# Patient Record
Sex: MALE
[De-identification: names, ages, dates, MRNs, and addresses within clinical notes are randomized per-mention and may not be internally consistent; named-entity substitution may affect disease eponyms.]

---

## 2020-01-27 ENCOUNTER — HOSPITAL ENCOUNTER (EMERGENCY)
Dept: HOSPITAL 92 - ERS | Age: 50
Discharge: HOME | End: 2020-01-27
Payer: COMMERCIAL

## 2020-01-27 DIAGNOSIS — E11.65: Primary | ICD-10-CM

## 2020-01-27 LAB
ALBUMIN SERPL BCG-MCNC: 4.9 G/DL (ref 3.5–5)
ALP SERPL-CCNC: 87 U/L (ref 40–110)
ALT SERPL W P-5'-P-CCNC: 23 U/L (ref 8–55)
ANION GAP SERPL CALC-SCNC: 14 MMOL/L (ref 10–20)
AST SERPL-CCNC: 17 U/L (ref 5–34)
BASOPHILS # BLD AUTO: 0 THOU/UL (ref 0–0.2)
BASOPHILS NFR BLD AUTO: 0.3 % (ref 0–1)
BILIRUB SERPL-MCNC: 0.5 MG/DL (ref 0.2–1.2)
BUN SERPL-MCNC: 11 MG/DL (ref 8.9–20.6)
CALCIUM SERPL-MCNC: 9.8 MG/DL (ref 7.8–10.44)
CHLORIDE SERPL-SCNC: 103 MMOL/L (ref 98–107)
CO2 SERPL-SCNC: 22 MMOL/L (ref 22–29)
CREAT CL PREDICTED SERPL C-G-VRATE: 0 ML/MIN (ref 70–130)
EOSINOPHIL # BLD AUTO: 0 THOU/UL (ref 0–0.7)
EOSINOPHIL NFR BLD AUTO: 0.5 % (ref 0–10)
GLOBULIN SER CALC-MCNC: 3.5 G/DL (ref 2.4–3.5)
GLUCOSE SERPL-MCNC: 281 MG/DL (ref 70–105)
HGB BLD-MCNC: 16.6 G/DL (ref 14–18)
LIPASE SERPL-CCNC: 133 U/L (ref 8–78)
LYMPHOCYTES # BLD: 2.1 THOU/UL (ref 1.2–3.4)
LYMPHOCYTES NFR BLD AUTO: 21.4 % (ref 21–51)
MCH RBC QN AUTO: 28.7 PG (ref 27–31)
MCV RBC AUTO: 86.1 FL (ref 78–98)
MONOCYTES # BLD AUTO: 0.3 THOU/UL (ref 0.11–0.59)
MONOCYTES NFR BLD AUTO: 2.8 % (ref 0–10)
NEUTROPHILS # BLD AUTO: 7.3 THOU/UL (ref 1.4–6.5)
NEUTROPHILS NFR BLD AUTO: 75.1 % (ref 42–75)
PLATELET # BLD AUTO: 311 THOU/UL (ref 130–400)
POTASSIUM SERPL-SCNC: 4.2 MMOL/L (ref 3.5–5.1)
RBC # BLD AUTO: 5.79 MILL/UL (ref 4.7–6.1)
SODIUM SERPL-SCNC: 135 MMOL/L (ref 136–145)
WBC # BLD AUTO: 9.7 THOU/UL (ref 4.8–10.8)

## 2020-01-27 PROCEDURE — 80053 COMPREHEN METABOLIC PANEL: CPT

## 2020-01-27 PROCEDURE — 93005 ELECTROCARDIOGRAM TRACING: CPT

## 2020-01-27 PROCEDURE — 36416 COLLJ CAPILLARY BLOOD SPEC: CPT

## 2020-01-27 PROCEDURE — 84484 ASSAY OF TROPONIN QUANT: CPT

## 2020-01-27 PROCEDURE — 82010 KETONE BODYS QUAN: CPT

## 2020-01-27 PROCEDURE — 96360 HYDRATION IV INFUSION INIT: CPT

## 2020-01-27 PROCEDURE — 85025 COMPLETE CBC W/AUTO DIFF WBC: CPT

## 2020-01-27 PROCEDURE — 83690 ASSAY OF LIPASE: CPT

## 2020-01-27 PROCEDURE — 71045 X-RAY EXAM CHEST 1 VIEW: CPT

## 2020-01-27 NOTE — RAD
EXAM: 

CHEST ONE VIEW



HISTORY:

Dizziness and generalized weakness.



COMPARISON:

8/15/2006.



FINDINGS:

The cardiac silhouette and pulmonary vasculature is within normal limits. The lungs are clear. The os
seous structures are intact. There has been no significant interval change from prior exam. 



IMPRESSION:

No acute cardiopulmonary process.



Reported By: Fili Bryant 

Electronically Signed:  1/27/2020 12:29 PM